# Patient Record
Sex: FEMALE | Race: WHITE | NOT HISPANIC OR LATINO | Employment: FULL TIME | URBAN - METROPOLITAN AREA
[De-identification: names, ages, dates, MRNs, and addresses within clinical notes are randomized per-mention and may not be internally consistent; named-entity substitution may affect disease eponyms.]

---

## 2019-03-09 ENCOUNTER — OFFICE VISIT (OUTPATIENT)
Dept: URGENT CARE | Facility: URGENT CARE | Age: 34
End: 2019-03-09

## 2019-03-09 VITALS
HEART RATE: 89 BPM | TEMPERATURE: 98.1 F | SYSTOLIC BLOOD PRESSURE: 108 MMHG | OXYGEN SATURATION: 99 % | DIASTOLIC BLOOD PRESSURE: 68 MMHG | RESPIRATION RATE: 14 BRPM

## 2019-03-09 DIAGNOSIS — J02.9 SORE THROAT: ICD-10-CM

## 2019-03-09 DIAGNOSIS — J03.90 EXUDATIVE TONSILLITIS: Primary | ICD-10-CM

## 2019-03-09 DIAGNOSIS — J06.9 VIRAL URI WITH COUGH: ICD-10-CM

## 2019-03-09 LAB
DEPRECATED S PYO AG THROAT QL EIA: NORMAL
SPECIMEN SOURCE: NORMAL

## 2019-03-09 PROCEDURE — 87880 STREP A ASSAY W/OPTIC: CPT | Performed by: INTERNAL MEDICINE

## 2019-03-09 PROCEDURE — 87081 CULTURE SCREEN ONLY: CPT | Performed by: INTERNAL MEDICINE

## 2019-03-09 PROCEDURE — 99213 OFFICE O/P EST LOW 20 MIN: CPT | Performed by: INTERNAL MEDICINE

## 2019-03-09 RX ORDER — AZITHROMYCIN 250 MG/1
TABLET, FILM COATED ORAL
Qty: 6 TABLET | Refills: 0 | Status: SHIPPED | OUTPATIENT
Start: 2019-03-09 | End: 2019-03-14

## 2019-03-09 ASSESSMENT — ENCOUNTER SYMPTOMS: FEVER: 0

## 2019-03-09 NOTE — LETTER
[facility]  [address] [address]  [phone]      March 9, 2019    RE:  Heike Frank                                                                                                                                                       [address]  [place] [address]            To whom it may concern:    Heike Frank is under my professional care for    Sore throat  Exudative tonsillitis  Viral URI with cough.       Please excuse up to 3 days of absence for illness.        Sincerely,        Jocelyne Worthington    [facility]

## 2019-03-09 NOTE — PROGRESS NOTES
SUBJECTIVE:   Heike Frank is a 34 year old female presenting with a chief complaint of   Chief Complaint   Patient presents with     Urgent Care     Pharyngitis     sore throat x 1 days, sniffles x 2 days.        She is an established patient of [facility].    URI Adult    Onset of symptoms was 4 day(s) ago.  Course of illness is worsening.      Current and Associated symptoms:   Pus in tonsils today  runny nose, cough - non-productive, facial pain/pressure, headache and body aches  Treatment measures tried include OTC Cough med.  Predisposing factors include ill contact: Work.    She is a non-smoker.       Review of Systems   Constitutional: Negative for fever.       No past medical history on file.  No family history on file.  Current Outpatient Medications   Medication Sig Dispense Refill     azithromycin (ZITHROMAX) 250 MG tablet Take 2 tablets (500 mg) by mouth daily for 1 day, THEN 1 tablet (250 mg) daily for 4 days. 6 tablet 0     Ibuprofen (ADVIL) 200 MG capsule Take 200 mg by mouth every 4 hours as needed.       Social History     Tobacco Use     Smoking status: Never Smoker     Smokeless tobacco: Never Used   Substance Use Topics     Alcohol use: Yes     Comment: ocassionally       OBJECTIVE  /68   Pulse 89   Temp 98.1  F (36.7  C) (Oral)   Resp 14   LMP 03/02/2019   SpO2 99%   Breastfeeding? No     Physical Exam   Constitutional: She appears well-developed and well-nourished.   HENT:   Tm nl B  right tonsil with 1 area of exudates  Runny nose   Eyes: Conjunctivae are normal.   Cardiovascular: Normal rate, regular rhythm and normal heart sounds.   Pulmonary/Chest: Effort normal and breath sounds normal.   Lymphadenopathy:     She has no cervical adenopathy.   Vitals reviewed.      Labs:  Results for orders placed or performed in visit on 03/09/19 (from the past 24 hour(s))   Strep, Rapid Screen   Result Value Ref Range    Specimen Description Throat     Rapid Strep A Screen        NEGATIVE: No Group A streptococcal antigen detected by immunoassay, await culture report.           ASSESSMENT:      ICD-10-CM    1. Exudative tonsillitis J03.90 azithromycin (ZITHROMAX) 250 MG tablet   2. Sore throat J02.9 Strep, Rapid Screen     Beta strep group A culture   3. Viral URI with cough J06.9     B97.89           Patient Instructions     zpak for exudative tonsillitis  Discussed it will not help viral illness    Fluids  Rest.    Call or return to clinic if symptoms worsen or fail to improve as anticipated.              Patient Education     Self-Care for Sore Throats    Sore throats happen for many reasons, such as colds, allergies, and infections caused by viruses or bacteria. In any case, your throat becomes red and sore. Your goal for self-care is to reduce your discomfort while giving your throat a chance to heal.  Moisten and soothe your throat  Tips include the following:    Try a sip of water first thing after waking up.    Keep your throat moist by drinking 6 or more glasses of clear liquids every day.    Run a cool-air humidifier in your room overnight.    Avoid cigarette smoke.     Suck on throat lozenges, cough drops, hard candy, ice chips, or frozen fruit-juice bars. Use the sugar-free versions if your diet or medical condition requires them.  Gargle to ease irritation  Gargling every hour or 2 can ease irritation. Try gargling with 1 of these solutions:    1/4 teaspoon of salt in 1/2 cup of warm water    An over-the-counter anesthetic gargle  Use medicine for more relief  Over-the-counter medicine can reduce sore throat symptoms. Ask your pharmacist if you have questions about which medicine to use:    Ease pain with anesthetic sprays. Aspirin or an aspirin substitute also helps. Remember, never give aspirin to anyone 18 or younger, or if you are already taking blood thinners.     For sore throats caused by allergies, try antihistamines to block the allergic reaction.    Remember: unless a  sore throat is caused by a bacterial infection, antibiotics won t help you.  Prevent future sore throats  Prevention tips include the following:    Stop smoking or reduce contact with secondhand smoke. Smoke irritates the tender throat lining.    Limit contact with pets and with allergy-causing substances, such as pollen and mold.    When you re around someone with a sore throat or cold, wash your hands often to keep viruses or bacteria from spreading.    Don t strain your vocal cords.  Call your healthcare provider  Contact your healthcare provider if you have:    A temperature over 101 F (38.3 C)    White spots on the throat    Great difficulty swallowing    Trouble breathing    A skin rash    Recent exposure to someone else with strep bacteria    Severe hoarseness and swollen glands in the neck or jaw   Date Last Reviewed: 8/1/2016 2000-2018 [employer]. [address] [place] [address], [place] [address]. All rights reserved. This information is not intended as a substitute for professional medical care. Always follow your healthcare professional's instructions.

## 2019-03-09 NOTE — PATIENT INSTRUCTIONS
zpak for exudative tonsillitis  Discussed it will not help viral illness    Fluids  Rest.    Call or return to clinic if symptoms worsen or fail to improve as anticipated.              Patient Education     Self-Care for Sore Throats    Sore throats happen for many reasons, such as colds, allergies, and infections caused by viruses or bacteria. In any case, your throat becomes red and sore. Your goal for self-care is to reduce your discomfort while giving your throat a chance to heal.  Moisten and soothe your throat  Tips include the following:    Try a sip of water first thing after waking up.    Keep your throat moist by drinking 6 or more glasses of clear liquids every day.    Run a cool-air humidifier in your room overnight.    Avoid cigarette smoke.     Suck on throat lozenges, cough drops, hard candy, ice chips, or frozen fruit-juice bars. Use the sugar-free versions if your diet or medical condition requires them.  Gargle to ease irritation  Gargling every hour or 2 can ease irritation. Try gargling with 1 of these solutions:    1/4 teaspoon of salt in 1/2 cup of warm water    An over-the-counter anesthetic gargle  Use medicine for more relief  Over-the-counter medicine can reduce sore throat symptoms. Ask your pharmacist if you have questions about which medicine to use:    Ease pain with anesthetic sprays. Aspirin or an aspirin substitute also helps. Remember, never give aspirin to anyone 18 or younger, or if you are already taking blood thinners.     For sore throats caused by allergies, try antihistamines to block the allergic reaction.    Remember: unless a sore throat is caused by a bacterial infection, antibiotics won t help you.  Prevent future sore throats  Prevention tips include the following:    Stop smoking or reduce contact with secondhand smoke. Smoke irritates the tender throat lining.    Limit contact with pets and with allergy-causing substances, such as pollen and mold.    When you re  around someone with a sore throat or cold, wash your hands often to keep viruses or bacteria from spreading.    Don t strain your vocal cords.  Call your healthcare provider  Contact your healthcare provider if you have:    A temperature over 101 F (38.3 C)    White spots on the throat    Great difficulty swallowing    Trouble breathing    A skin rash    Recent exposure to someone else with strep bacteria    Severe hoarseness and swollen glands in the neck or jaw   Date Last Reviewed: 8/1/2016 2000-2018 [employer]. [address] [place] [address]. All rights reserved. This information is not intended as a substitute for professional medical care. Always follow your healthcare professional's instructions.

## 2019-03-10 LAB
BACTERIA SPEC CULT: NORMAL
SPECIMEN SOURCE: NORMAL

## 2024-10-19 ENCOUNTER — OFFICE VISIT (OUTPATIENT)
Dept: URGENT CARE | Facility: URGENT CARE | Age: 39
End: 2024-10-19
Payer: COMMERCIAL

## 2024-10-19 VITALS
DIASTOLIC BLOOD PRESSURE: 94 MMHG | SYSTOLIC BLOOD PRESSURE: 135 MMHG | OXYGEN SATURATION: 99 % | RESPIRATION RATE: 18 BRPM | HEART RATE: 83 BPM | WEIGHT: 194 LBS | BODY MASS INDEX: 34.37 KG/M2 | TEMPERATURE: 97.9 F

## 2024-10-19 DIAGNOSIS — J01.90 ACUTE NON-RECURRENT SINUSITIS, UNSPECIFIED LOCATION: Primary | ICD-10-CM

## 2024-10-19 PROCEDURE — 99203 OFFICE O/P NEW LOW 30 MIN: CPT | Performed by: PHYSICIAN ASSISTANT

## 2024-10-19 RX ORDER — CYCLOBENZAPRINE HCL 5 MG
2.5-1 TABLET ORAL
COMMUNITY
Start: 2023-10-10

## 2024-10-19 RX ORDER — BUPROPION HYDROCHLORIDE 300 MG/1
1 TABLET ORAL DAILY
COMMUNITY
Start: 2023-10-10

## 2024-10-19 RX ORDER — ESCITALOPRAM OXALATE 20 MG/1
20 TABLET ORAL DAILY
COMMUNITY
Start: 2023-10-10

## 2024-10-19 NOTE — LETTER
October 19, 2024      Heike Frank  [address]   [place] [address]        To Whom It May Concern:    Heike Frank was seen here today for evaluation of an acute medical problem. Please excuse her from missed work today.       Sincerely,        Pio Mccollum PA-C

## 2024-10-20 NOTE — PROGRESS NOTES
"  ASSESSMENT/PLAN:    (J01.90) Acute non-recurrent sinusitis, unspecified location  (primary encounter diagnosis)  MDM: Viral URI/viral sinusitis with associated mild ETD left ear.   Plan: amoxicillin-clavulanate (AUGMENTIN) 875-125 MG         tablet            October 19, 2024 Urgent Care Plan:     -Monitor symptoms at home. If sinus symptoms do not resolve in the next several days to one week, you can fill the prescription for Augmentin I provided today.     -You can try over the counter Flonase nasal spray for 2-3 weeks     -Follow-up if your symptoms do not clear up with the above, or if new symptoms develop       This progress note has been dictated, with use of voice recognition software. Any grammatical, typographical, or context errors are unintentional and inherent to use of voice recognition software.  --------------        SUBJECTIVE:    Heike Frank  presents to  today for evaluation of acute onset nasal congestion, purulent rhinorrhea, that began approximately 1 week ago.  Today, she  left also developed acute, intermittent, left ear pain.  She reports several times her ear \"popped\" and she found this briefly painful.  No purulent or bloody ear drainage.    Associated Symptoms: Intermittent cough related to post-nasal drip     Home Covid testing reportedly negative       ROS: Positive as per above. No associated fever, chills, severe cough,  shortness of breath, wheezing, sore throat, abdominal pain, nausea, vomiting, diarrhea, body aches, headaches, rashes, joint swelling or other acute illness symptoms.          No past medical history on file.  Current Outpatient Medications   Medication Sig Dispense Refill    buPROPion (WELLBUTRIN XL) 300 MG 24 hr tablet Take 1 tablet by mouth daily.      cyclobenzaprine (FLEXERIL) 5 MG tablet Take 2.5-10 mg by mouth.      escitalopram (LEXAPRO) 20 MG tablet Take 20 mg by mouth daily.      Ibuprofen (ADVIL) 200 MG capsule Take 200 mg by mouth every 4 " hours as needed. (Patient not taking: Reported on 10/19/2024)       No current facility-administered medications for this visit.     Allergies   Allergen Reactions    Codeine Sulfate     Morphine And Codeine     Latex Rash         OBJECTIVE:  BP (!) 135/94 (BP Location: Right arm, Patient Position: Sitting, Cuff Size: Adult Regular)   Pulse 83   Temp 97.9  F (36.6  C) (Temporal)   Resp 18   Wt 88 kg (194 lb)   LMP  (LMP Unknown)   SpO2 99%   BMI 34.37 kg/m        General appearance: alert and no apparent distress  Skin color is uniform in color and without rash.  HEENT:   Conjunctiva not injected.  Sclera clear.  Left TM is intact and normal in color. Small amount of clear fluid and slightly wide light reflex   Right TM  is  intact and normal in color. Normal light reflex  Nasal mucosa is congested  Oropharyngeal exam is normal other than evidence of post-nasal drip: no lesions, erythema, adenopathy or exudate.  NECK: Trachea is midline. Neck is supple, FROM, with no adenopathy  CARDIAC:NORMAL - regular rate and rhythm without murmur.  RESP: Normal - CTA without rales, rhonchi, or wheezing.  NEURO: Alert and oriented.  Normal speech and mentation.  CN II/XII grossly intact.  Gait within normal limits.

## 2024-10-20 NOTE — PATIENT INSTRUCTIONS
October 19, 2024 Urgent Care Plan:     -Monitor symptoms at home. If sinus symptoms do not resolve in the next several days to one week, you can fill the prescription for Augmentin I provided today.     -You can try over the counter Flonase nasal spray for 2-3 weeks     -Follow-up if your symptoms do not clear up with the above, or if new symptoms develop